# Patient Record
Sex: FEMALE | Race: WHITE | Employment: FULL TIME | URBAN - METROPOLITAN AREA
[De-identification: names, ages, dates, MRNs, and addresses within clinical notes are randomized per-mention and may not be internally consistent; named-entity substitution may affect disease eponyms.]

---

## 2023-06-30 LAB
ABO, EXTERNAL RESULT: NORMAL
HEP B, EXTERNAL RESULT: NEGATIVE
HEPATITIS C ANTIBODY, EXTERNAL RESULT: NON REACTIVE
HIV, EXTERNAL RESULT: NON REACTIVE
RH FACTOR, EXTERNAL RESULT: POSITIVE
RUBELLA TITER, EXTERNAL RESULT: NORMAL
T. PALLIDUM (SYPHILIS) ANTIBODY, EXTERNAL RESULT: NON REACTIVE

## 2023-07-01 LAB
C. TRACHOMATIS, EXTERNAL RESULT: NEGATIVE
N. GONORRHOEAE, EXTERNAL RESULT: NEGATIVE

## 2024-01-11 LAB — GBS, EXTERNAL RESULT: POSITIVE

## 2024-02-03 ENCOUNTER — HOSPITAL ENCOUNTER (INPATIENT)
Facility: HOSPITAL | Age: 29
LOS: 2 days | Discharge: HOME | End: 2024-02-05
Attending: OBSTETRICS & GYNECOLOGY | Admitting: OBSTETRICS & GYNECOLOGY
Payer: COMMERCIAL

## 2024-02-03 ENCOUNTER — ANESTHESIA EVENT (OUTPATIENT)
Dept: LABOR AND DELIVERY | Facility: HOSPITAL | Age: 29
End: 2024-02-03
Payer: COMMERCIAL

## 2024-02-03 ENCOUNTER — ANESTHESIA (OUTPATIENT)
Dept: LABOR AND DELIVERY | Facility: HOSPITAL | Age: 29
End: 2024-02-03
Payer: COMMERCIAL

## 2024-02-03 PROBLEM — O42.90 PROM (PREMATURE RUPTURE OF MEMBRANES): Status: ACTIVE | Noted: 2024-02-03

## 2024-02-03 LAB
ABO + RH BLD: NORMAL
AMPHET UR QL SCN: NEGATIVE
BARBITURATES UR QL SCN: NEGATIVE
BASOPHILS # BLD: 0.1 K/UL (ref 0–0.1)
BASOPHILS NFR BLD: 0 % (ref 0–1)
BENZODIAZ UR QL: NEGATIVE
BLOOD GROUP ANTIBODIES SERPL: NORMAL
CANNABINOIDS UR QL SCN: NEGATIVE
COCAINE UR QL SCN: NEGATIVE
DIFFERENTIAL METHOD BLD: ABNORMAL
EOSINOPHIL # BLD: 0.1 K/UL (ref 0–0.4)
EOSINOPHIL NFR BLD: 1 % (ref 0–7)
ERYTHROCYTE [DISTWIDTH] IN BLOOD BY AUTOMATED COUNT: 14.2 % (ref 11.5–14.5)
HCT VFR BLD AUTO: 31.3 % (ref 35–47)
HCT VFR BLD AUTO: 32.4 % (ref 35–47)
HCT VFR BLD AUTO: 34.4 % (ref 35–47)
HCT VFR BLD AUTO: 42 % (ref 35–47)
HGB BLD-MCNC: 10.4 G/DL (ref 11.5–16)
HGB BLD-MCNC: 10.6 G/DL (ref 11.5–16)
HGB BLD-MCNC: 11.5 G/DL (ref 11.5–16)
HGB BLD-MCNC: 13.8 G/DL (ref 11.5–16)
IMM GRANULOCYTES # BLD AUTO: 0.2 K/UL (ref 0–0.04)
IMM GRANULOCYTES NFR BLD AUTO: 1 % (ref 0–0.5)
LYMPHOCYTES # BLD: 2.9 K/UL (ref 0.8–3.5)
LYMPHOCYTES NFR BLD: 20 % (ref 12–49)
Lab: NORMAL
MCH RBC QN AUTO: 27.1 PG (ref 26–34)
MCHC RBC AUTO-ENTMCNC: 32.9 G/DL (ref 30–36.5)
MCV RBC AUTO: 82.4 FL (ref 80–99)
METHADONE UR QL: NEGATIVE
MONOCYTES # BLD: 0.9 K/UL (ref 0–1)
MONOCYTES NFR BLD: 6 % (ref 5–13)
NEUTS SEG # BLD: 10.4 K/UL (ref 1.8–8)
NEUTS SEG NFR BLD: 72 % (ref 32–75)
NRBC # BLD: 0 K/UL (ref 0–0.01)
NRBC BLD-RTO: 0 PER 100 WBC
OPIATES UR QL: NEGATIVE
PCP UR QL: NEGATIVE
PLATELET # BLD AUTO: 385 K/UL (ref 150–400)
PMV BLD AUTO: 10.3 FL (ref 8.9–12.9)
RBC # BLD AUTO: 5.1 M/UL (ref 3.8–5.2)
SPECIMEN EXP DATE BLD: NORMAL
WBC # BLD AUTO: 14.5 K/UL (ref 3.6–11)

## 2024-02-03 PROCEDURE — 3700000156 HC EPIDURAL ANESTHESIA

## 2024-02-03 PROCEDURE — 1120000000 HC RM PRIVATE OB

## 2024-02-03 PROCEDURE — 4A1HXCZ MONITORING OF PRODUCTS OF CONCEPTION, CARDIAC RATE, EXTERNAL APPROACH: ICD-10-PCS | Performed by: OBSTETRICS & GYNECOLOGY

## 2024-02-03 PROCEDURE — 7210000100 HC LABOR FEE PER 1 HR

## 2024-02-03 PROCEDURE — 86901 BLOOD TYPING SEROLOGIC RH(D): CPT

## 2024-02-03 PROCEDURE — 4500000002 HC ER NO CHARGE

## 2024-02-03 PROCEDURE — 6370000000 HC RX 637 (ALT 250 FOR IP): Performed by: OBSTETRICS & GYNECOLOGY

## 2024-02-03 PROCEDURE — 51702 INSERT TEMP BLADDER CATH: CPT

## 2024-02-03 PROCEDURE — 2580000003 HC RX 258: Performed by: OBSTETRICS & GYNECOLOGY

## 2024-02-03 PROCEDURE — 0KQM0ZZ REPAIR PERINEUM MUSCLE, OPEN APPROACH: ICD-10-PCS | Performed by: OBSTETRICS & GYNECOLOGY

## 2024-02-03 PROCEDURE — 85014 HEMATOCRIT: CPT

## 2024-02-03 PROCEDURE — 6360000002 HC RX W HCPCS: Performed by: ANESTHESIOLOGY

## 2024-02-03 PROCEDURE — 0US9XZZ REPOSITION UTERUS, EXTERNAL APPROACH: ICD-10-PCS | Performed by: OBSTETRICS & GYNECOLOGY

## 2024-02-03 PROCEDURE — 36415 COLL VENOUS BLD VENIPUNCTURE: CPT

## 2024-02-03 PROCEDURE — 85025 COMPLETE CBC W/AUTO DIFF WBC: CPT

## 2024-02-03 PROCEDURE — 6360000002 HC RX W HCPCS: Performed by: OBSTETRICS & GYNECOLOGY

## 2024-02-03 PROCEDURE — 86850 RBC ANTIBODY SCREEN: CPT

## 2024-02-03 PROCEDURE — 80307 DRUG TEST PRSMV CHEM ANLYZR: CPT

## 2024-02-03 PROCEDURE — 2500000003 HC RX 250 WO HCPCS

## 2024-02-03 PROCEDURE — 3E033VJ INTRODUCTION OF OTHER HORMONE INTO PERIPHERAL VEIN, PERCUTANEOUS APPROACH: ICD-10-PCS | Performed by: OBSTETRICS & GYNECOLOGY

## 2024-02-03 PROCEDURE — 7220000101 HC DELIVERY VAGINAL/SINGLE

## 2024-02-03 PROCEDURE — 6360000002 HC RX W HCPCS

## 2024-02-03 PROCEDURE — 00HU33Z INSERTION OF INFUSION DEVICE INTO SPINAL CANAL, PERCUTANEOUS APPROACH: ICD-10-PCS | Performed by: OBSTETRICS & GYNECOLOGY

## 2024-02-03 PROCEDURE — 10D17ZZ EXTRACTION OF PRODUCTS OF CONCEPTION, RETAINED, VIA NATURAL OR ARTIFICIAL OPENING: ICD-10-PCS | Performed by: OBSTETRICS & GYNECOLOGY

## 2024-02-03 PROCEDURE — 62325 NJX INTERLAMINAR CRV/THRC: CPT | Performed by: ANESTHESIOLOGY

## 2024-02-03 PROCEDURE — 85018 HEMOGLOBIN: CPT

## 2024-02-03 PROCEDURE — 2500000003 HC RX 250 WO HCPCS: Performed by: ANESTHESIOLOGY

## 2024-02-03 PROCEDURE — 86900 BLOOD TYPING SEROLOGIC ABO: CPT

## 2024-02-03 RX ORDER — ONDANSETRON 2 MG/ML
4 INJECTION INTRAMUSCULAR; INTRAVENOUS EVERY 6 HOURS PRN
Status: DISCONTINUED | OUTPATIENT
Start: 2024-02-03 | End: 2024-02-03

## 2024-02-03 RX ORDER — SODIUM CHLORIDE 0.9 % (FLUSH) 0.9 %
5-40 SYRINGE (ML) INJECTION EVERY 12 HOURS SCHEDULED
Status: DISCONTINUED | OUTPATIENT
Start: 2024-02-03 | End: 2024-02-03

## 2024-02-03 RX ORDER — EPHEDRINE SULFATE/0.9% NACL/PF 50 MG/5 ML
10 SYRINGE (ML) INTRAVENOUS ONCE
Status: COMPLETED | OUTPATIENT
Start: 2024-02-03 | End: 2024-02-03

## 2024-02-03 RX ORDER — SODIUM CHLORIDE, SODIUM LACTATE, POTASSIUM CHLORIDE, CALCIUM CHLORIDE 600; 310; 30; 20 MG/100ML; MG/100ML; MG/100ML; MG/100ML
INJECTION, SOLUTION INTRAVENOUS CONTINUOUS
Status: DISCONTINUED | OUTPATIENT
Start: 2024-02-03 | End: 2024-02-03

## 2024-02-03 RX ORDER — SODIUM CHLORIDE 9 MG/ML
25 INJECTION, SOLUTION INTRAVENOUS PRN
Status: DISCONTINUED | OUTPATIENT
Start: 2024-02-03 | End: 2024-02-03

## 2024-02-03 RX ORDER — CARBOPROST TROMETHAMINE 250 UG/ML
250 INJECTION, SOLUTION INTRAMUSCULAR PRN
Status: DISCONTINUED | OUTPATIENT
Start: 2024-02-03 | End: 2024-02-03

## 2024-02-03 RX ORDER — MISOPROSTOL 200 UG/1
800 TABLET ORAL PRN
Status: DISCONTINUED | OUTPATIENT
Start: 2024-02-03 | End: 2024-02-03

## 2024-02-03 RX ORDER — LANOLIN/MINERAL OIL
LOTION (ML) TOPICAL PRN
Status: DISCONTINUED | OUTPATIENT
Start: 2024-02-03 | End: 2024-02-05 | Stop reason: HOSPADM

## 2024-02-03 RX ORDER — FENTANYL 0.2 MG/100ML-BUPIV 0.125%-NACL 0.9% EPIDURAL INJ 2/0.125 MCG/ML-%
1-15 SOLUTION INJECTION CONTINUOUS
Status: DISCONTINUED | OUTPATIENT
Start: 2024-02-03 | End: 2024-02-03

## 2024-02-03 RX ORDER — BUPIVACAINE HYDROCHLORIDE 2.5 MG/ML
INJECTION, SOLUTION EPIDURAL; INFILTRATION; INTRACAUDAL
Status: COMPLETED
Start: 2024-02-03 | End: 2024-02-03

## 2024-02-03 RX ORDER — SODIUM CHLORIDE 0.9 % (FLUSH) 0.9 %
5-40 SYRINGE (ML) INJECTION PRN
Status: DISCONTINUED | OUTPATIENT
Start: 2024-02-03 | End: 2024-02-05 | Stop reason: HOSPADM

## 2024-02-03 RX ORDER — SODIUM CHLORIDE 0.9 % (FLUSH) 0.9 %
5-40 SYRINGE (ML) INJECTION PRN
Status: DISCONTINUED | OUTPATIENT
Start: 2024-02-03 | End: 2024-02-03

## 2024-02-03 RX ORDER — DIPHENHYDRAMINE HYDROCHLORIDE 50 MG/ML
12.5 INJECTION INTRAMUSCULAR; INTRAVENOUS EVERY 4 HOURS PRN
Status: DISCONTINUED | OUTPATIENT
Start: 2024-02-03 | End: 2024-02-03

## 2024-02-03 RX ORDER — SODIUM CHLORIDE, SODIUM LACTATE, POTASSIUM CHLORIDE, AND CALCIUM CHLORIDE .6; .31; .03; .02 G/100ML; G/100ML; G/100ML; G/100ML
500 INJECTION, SOLUTION INTRAVENOUS PRN
Status: DISCONTINUED | OUTPATIENT
Start: 2024-02-03 | End: 2024-02-03

## 2024-02-03 RX ORDER — ACETAMINOPHEN 500 MG
1000 TABLET ORAL EVERY 8 HOURS SCHEDULED
Status: DISCONTINUED | OUTPATIENT
Start: 2024-02-03 | End: 2024-02-05 | Stop reason: HOSPADM

## 2024-02-03 RX ORDER — OXYCODONE HYDROCHLORIDE 5 MG/1
5 TABLET ORAL EVERY 4 HOURS PRN
Status: DISCONTINUED | OUTPATIENT
Start: 2024-02-03 | End: 2024-02-05 | Stop reason: HOSPADM

## 2024-02-03 RX ORDER — CLINDAMYCIN PHOSPHATE 900 MG/50ML
900 INJECTION, SOLUTION INTRAVENOUS EVERY 8 HOURS
Status: DISCONTINUED | OUTPATIENT
Start: 2024-02-03 | End: 2024-02-04

## 2024-02-03 RX ORDER — DOCUSATE SODIUM 100 MG/1
100 CAPSULE, LIQUID FILLED ORAL 2 TIMES DAILY
Status: DISCONTINUED | OUTPATIENT
Start: 2024-02-03 | End: 2024-02-05 | Stop reason: HOSPADM

## 2024-02-03 RX ORDER — BUPIVACAINE HYDROCHLORIDE 2.5 MG/ML
INJECTION, SOLUTION EPIDURAL; INFILTRATION; INTRACAUDAL PRN
Status: DISCONTINUED | OUTPATIENT
Start: 2024-02-03 | End: 2024-02-03 | Stop reason: SDUPTHER

## 2024-02-03 RX ORDER — DOCUSATE SODIUM 100 MG/1
100 CAPSULE, LIQUID FILLED ORAL 2 TIMES DAILY
Status: DISCONTINUED | OUTPATIENT
Start: 2024-02-03 | End: 2024-02-03

## 2024-02-03 RX ORDER — FENTANYL 0.2 MG/100ML-BUPIV 0.125%-NACL 0.9% EPIDURAL INJ 2/0.125 MCG/ML-%
SOLUTION INJECTION
Status: COMPLETED
Start: 2024-02-03 | End: 2024-02-03

## 2024-02-03 RX ORDER — FENTANYL CITRATE 50 UG/ML
INJECTION, SOLUTION INTRAMUSCULAR; INTRAVENOUS
Status: COMPLETED
Start: 2024-02-03 | End: 2024-02-03

## 2024-02-03 RX ORDER — IBUPROFEN 400 MG/1
800 TABLET ORAL EVERY 8 HOURS SCHEDULED
Status: DISCONTINUED | OUTPATIENT
Start: 2024-02-03 | End: 2024-02-05 | Stop reason: HOSPADM

## 2024-02-03 RX ORDER — FENTANYL CITRATE 50 UG/ML
50 INJECTION, SOLUTION INTRAMUSCULAR; INTRAVENOUS ONCE
Status: COMPLETED | OUTPATIENT
Start: 2024-02-03 | End: 2024-02-03

## 2024-02-03 RX ORDER — SODIUM CHLORIDE 9 MG/ML
INJECTION, SOLUTION INTRAVENOUS PRN
Status: DISCONTINUED | OUTPATIENT
Start: 2024-02-03 | End: 2024-02-05 | Stop reason: HOSPADM

## 2024-02-03 RX ORDER — ONDANSETRON 8 MG/1
4 TABLET, ORALLY DISINTEGRATING ORAL EVERY 8 HOURS PRN
Status: ON HOLD | COMMUNITY
End: 2024-02-05 | Stop reason: HOSPADM

## 2024-02-03 RX ORDER — METHYLERGONOVINE MALEATE 0.2 MG/ML
200 INJECTION INTRAVENOUS PRN
Status: DISCONTINUED | OUTPATIENT
Start: 2024-02-03 | End: 2024-02-03

## 2024-02-03 RX ORDER — SODIUM CHLORIDE, SODIUM LACTATE, POTASSIUM CHLORIDE, AND CALCIUM CHLORIDE .6; .31; .03; .02 G/100ML; G/100ML; G/100ML; G/100ML
1000 INJECTION, SOLUTION INTRAVENOUS ONCE
Status: COMPLETED | OUTPATIENT
Start: 2024-02-03 | End: 2024-02-03

## 2024-02-03 RX ORDER — NALOXONE HYDROCHLORIDE 0.4 MG/ML
INJECTION, SOLUTION INTRAMUSCULAR; INTRAVENOUS; SUBCUTANEOUS PRN
Status: DISCONTINUED | OUTPATIENT
Start: 2024-02-03 | End: 2024-02-03

## 2024-02-03 RX ORDER — SODIUM CHLORIDE, SODIUM LACTATE, POTASSIUM CHLORIDE, AND CALCIUM CHLORIDE .6; .31; .03; .02 G/100ML; G/100ML; G/100ML; G/100ML
1000 INJECTION, SOLUTION INTRAVENOUS PRN
Status: DISCONTINUED | OUTPATIENT
Start: 2024-02-03 | End: 2024-02-03

## 2024-02-03 RX ADMIN — FENTANYL 0.2 MG/100ML-BUPIV 0.125%-NACL 0.9% EPIDURAL INJ 12 ML/HR: 2/0.125 SOLUTION at 11:01

## 2024-02-03 RX ADMIN — Medication 12 ML/HR: at 11:01

## 2024-02-03 RX ADMIN — IRON SUCROSE 200 MG: 20 INJECTION, SOLUTION INTRAVENOUS at 22:03

## 2024-02-03 RX ADMIN — OXYTOCIN 87.3 MILLI-UNITS/MIN: 10 INJECTION INTRAVENOUS at 18:53

## 2024-02-03 RX ADMIN — SODIUM CHLORIDE, POTASSIUM CHLORIDE, SODIUM LACTATE AND CALCIUM CHLORIDE 1000 ML: 600; 310; 30; 20 INJECTION, SOLUTION INTRAVENOUS at 19:43

## 2024-02-03 RX ADMIN — Medication 10 MG: at 10:42

## 2024-02-03 RX ADMIN — CLINDAMYCIN PHOSPHATE 900 MG: 900 INJECTION, SOLUTION INTRAVENOUS at 09:29

## 2024-02-03 RX ADMIN — FENTANYL CITRATE 50 MCG: 50 INJECTION INTRAMUSCULAR; INTRAVENOUS at 18:43

## 2024-02-03 RX ADMIN — SODIUM CHLORIDE, POTASSIUM CHLORIDE, SODIUM LACTATE AND CALCIUM CHLORIDE: 600; 310; 30; 20 INJECTION, SOLUTION INTRAVENOUS at 14:20

## 2024-02-03 RX ADMIN — BUPIVACAINE HYDROCHLORIDE 3 ML: 2.5 INJECTION, SOLUTION EPIDURAL; INFILTRATION; INTRACAUDAL; PERINEURAL at 10:25

## 2024-02-03 RX ADMIN — Medication 166.7 ML: at 18:35

## 2024-02-03 RX ADMIN — GENTAMICIN SULFATE 115.2 MG: 40 INJECTION, SOLUTION INTRAMUSCULAR; INTRAVENOUS at 19:45

## 2024-02-03 RX ADMIN — SODIUM CHLORIDE, POTASSIUM CHLORIDE, SODIUM LACTATE AND CALCIUM CHLORIDE: 600; 310; 30; 20 INJECTION, SOLUTION INTRAVENOUS at 09:13

## 2024-02-03 RX ADMIN — IBUPROFEN 800 MG: 400 TABLET, FILM COATED ORAL at 20:34

## 2024-02-03 RX ADMIN — ACETAMINOPHEN 1000 MG: 500 TABLET ORAL at 21:45

## 2024-02-03 RX ADMIN — BUPIVACAINE HYDROCHLORIDE 5 ML: 2.5 INJECTION, SOLUTION EPIDURAL; INFILTRATION; INTRACAUDAL; PERINEURAL at 10:26

## 2024-02-03 RX ADMIN — Medication 166.7 ML: at 18:41

## 2024-02-03 RX ADMIN — BUPIVACAINE HYDROCHLORIDE 3 ML: 2.5 INJECTION, SOLUTION EPIDURAL; INFILTRATION; INTRACAUDAL; PERINEURAL at 10:27

## 2024-02-03 RX ADMIN — DOCUSATE SODIUM 100 MG: 100 CAPSULE, LIQUID FILLED ORAL at 20:34

## 2024-02-03 RX ADMIN — FENTANYL CITRATE 50 MCG: 50 INJECTION, SOLUTION INTRAMUSCULAR; INTRAVENOUS at 18:43

## 2024-02-03 RX ADMIN — CLINDAMYCIN PHOSPHATE 900 MG: 900 INJECTION, SOLUTION INTRAVENOUS at 17:49

## 2024-02-03 ASSESSMENT — PAIN DESCRIPTION - DESCRIPTORS: DESCRIPTORS: CRAMPING

## 2024-02-03 ASSESSMENT — PAIN SCALES - GENERAL: PAINLEVEL_OUTOF10: 1

## 2024-02-03 ASSESSMENT — PAIN DESCRIPTION - LOCATION: LOCATION: ABDOMEN

## 2024-02-03 NOTE — PROGRESS NOTES
In to evaluate patient  Variable decelerations with pushing    She has been pushing for an hour. Evaluated pushing. Will give 15-20 minute rest, then resume pushing.   Small amount of caput, some progress with pushing

## 2024-02-03 NOTE — PROGRESS NOTES
In to see patient, prolonged fetal deceleration after epidural placement for 7-8 minutes, stephanie at 90's. Improved with position change, hypotension noted, discussed ephedrine, but did improve with position change, fluids.   Cervix examined by staff-- 6cm    Discussed all with patient and . Fetal tracing now 120's, minimal-moderate variability.

## 2024-02-03 NOTE — ANESTHESIA PROCEDURE NOTES
Epidural Block    Patient location during procedure: OB  Start time: 2/3/2024 10:20 AM  End time: 2/3/2024 10:27 AM  Reason for block: labor epidural and post-op pain management  Staffing  Performed: anesthesiologist   Anesthesiologist: Taj Pollack MD  Performed by: Taj Pollack MD  Authorized by: Taj Pollack MD    Epidural  Patient position: sitting  Prep: DuraPrep  Patient monitoring: frequent blood pressure checks and continuous pulse ox  Approach: midline  Location: L3-4  Injection technique: OLY air  Provider prep: mask and sterile gloves  Needle  Needle type: Notizza   Needle gauge: 17 G  Needle length: 3.5 in  Needle insertion depth: 8 cm  Catheter type: end hole  Catheter size: 19 G  Catheter at skin depth: 12 cm  Test dose: negativeCatheter Secured: tegaderm and tape  Assessment  Hemodynamics: stable  Attempts: 1  Outcomes: uncomplicated  Preanesthetic Checklist  Completed: patient identified, IV checked, site marked, risks and benefits discussed, surgical/procedural consents, equipment checked, pre-op evaluation, timeout performed, anesthesia consent given, oxygen available, monitors applied/VS acknowledged and fire risk safety assessment completed and verbalized

## 2024-02-03 NOTE — ANESTHESIA PRE PROCEDURE
acid (CYKLOKAPRON) 1,000 mg in sodium chloride 0.9 % 110 mL IVPB (mini-bag)  1,000 mg IntraVENous Once PRN Patricia Mulilgan MD       • oxytocin (PITOCIN) 30 units in 500 mL infusion  87.3 jorge alberto-units/min IntraVENous Continuous PRN Patricia Mulligan MD        And   • oxytocin (PITOCIN) 10 unit bolus from the bag  10 Units IntraVENous PRN Patricia Mulligan MD       • ondansetron (ZOFRAN) injection 4 mg  4 mg IntraVENous Q6H PRN Patricia Mulligan MD       • docusate sodium (COLACE) capsule 100 mg  100 mg Oral BID Patricia Mulligan MD       • diphenhydrAMINE (BENADRYL) injection 12.5 mg  12.5 mg IntraVENous Q4H PRN Patricai Mulligan MD       • fentaNYL 2 mcg/mL BUPivacaine 0.125% in sodium chloride 0.9% 100 mL 0.2-0.125-0.9 MG/100ML-% epidural infusion            • fentaNYL 2 mcg/mL BUPivacaine 0.125% in sodium chloride 0.9% 100 mL epidural infusion  1-15 mL/hr Epidural Continuous Taj Pollack MD       • naloxone 0.4 mg in 10 mL sodium chloride syringe   IntraVENous PRN Taj Pollack MD         Facility-Administered Medications Ordered in Other Encounters   Medication Dose Route Frequency Provider Last Rate Last Admin   • BUPivacaine (PF) (MARCAINE) 0.25 % injection   Epidural PRN Taj Pollack MD   3 mL at 02/03/24 1027       Allergies:    Allergies   Allergen Reactions   • Penicillins Shortness Of Breath   • Sulfa Antibiotics Shortness Of Breath       Problem List:    Patient Active Problem List   Diagnosis Code   • Dermatitis L30.9   • PROM (premature rupture of membranes) O42.90       Past Medical History:        Diagnosis Date   • Anxiety        Past Surgical History:  History reviewed. No pertinent surgical history.    Social History:    Social History     Tobacco Use   • Smoking status: Not on file   • Smokeless tobacco: Not on file   Substance Use Topics   • Alcohol use: Not on file                                Counseling given: Not Answered      Vital Signs (Current):   Vitals:    02/03/24 0902

## 2024-02-03 NOTE — PROGRESS NOTES
Patient arrived to L&D triage from home with complaints of contractions and LOF. Patient states has been diana on and off all night, ROM at about 0700 and contractions picked up and became more consistent then. Patient of Dr. Moncada at [facility], , GEORIGANA 24 39w2d. Patient confirms positive fetal movement. Patient denies vaginal bleeding, headaches, vision changes, RUQ pain.   Patient membranes grossly ruptured on assessment, meconium stained.     905 - dr. Servin made aware of patient arrival. Telephone order for clindamycin     913 - IV fluid bolus for epidural started.     918 - Dr. Servin at bedside, SVE done with patient consent. POC discussed with patient   24 09   Cervical Exam   Dilation (cm) 4   Effacement 90   Station -1   Station (Labor Curve Graph) 6   OB Examiner Dr servin     1015 - Anesthesia at bedside at 1015. Patient positioned to side of the bed, EFM & TOCO adjusted to accommodate sterile field. Difficulty tracing due to maternal position. Time out completed at 1016. Successful epidural is completed by Dr Pollack. Patient is repositioned supine in bed with wedge under right hip. EFM and TOCO replaced and blood pressure frequency increased. Coffman catheter placed and epidural continuous infusion is started.    1032 - FHR to 110. Patient turned later laterally multiple times.   1035 - SVE done by THERESA Bertrand RN, 6cm  1036 - Dr. Servin at bedside to assess patient turning prolonged decel. Dr. Pollack at bedside to assess epidural. Patient hypotensive. Verbal order for 500ml IV fluid bolus. Verbal order for 10mg of ephedrine, give x2 IV PRN and PRN 10mg IM if needed after 2 IV doses.   1042 - 10mg IV ephedrine given    1140 - patient made RN aware that was having increased pain in L side of back with contractions. Patient repositioned to see if gave relief     1150 - Patient with minimal relief. Patient educated on epidural bolus button. Button hit by patient and patient laying on L

## 2024-02-03 NOTE — H&P
Active Problem List   Diagnosis    Dermatitis    PROM (premature rupture of membranes)     Assessment and Plan:     29 y/o G1 @ 39w2d @ 39w2d presents with PROM    IUP- category 1    2. PROM- cervix unchanged from office exam but diana regularly. Will re evaluate. Patient desires epidural. Vertex on exam    3. GBS +: allergic to penicillin but GBS is susceptible to clindamycin- initiated this for prophylaxis.     4. Anxiety- no medications    5. Meconium- discussed outcomes with patient and  including possible intubation of infant. Will evaluate immediately post  as baby transitions.        I spent 40 minutes in review of patient's history, performing physical exam, admission and review of prenatal from 2023- 2024.     Signed By:  Patricia Mulligan MD     February 3, 2024

## 2024-02-03 NOTE — PROGRESS NOTES
Labor Progress Note  Patient seen, fetal heart rate and contraction pattern evaluated.     Physical Exam:  /86   Pulse 100   Temp 97.7 °F (36.5 °C) (Oral)   Resp 16   Ht 1.575 m (5' 2\")   Wt 116.6 kg (257 lb)   SpO2 99%   BMI 47.01 kg/m²   Cervical Exam: complete, +1  Membranes:  SROM, meconium  Uterine Activity: Frequency: 2 minutes  Fetal Heart Rate: 120 - 130,  adequate variability and reactivity  Accelerations: yes  Decelerations: no    Assessment/Plan: 29 y/o  @ 39wks with SROM    IUP- Reassuring fetal status, category 1    2. Labor/SROM- will try throne for 30-40 minutes, allow additional descent then attempt pushing    3. GBS+ : clinrenzoycin    NAVDEEP Mulligan MD

## 2024-02-03 NOTE — LACTATION NOTE
Discussed with mother her plan for feeding.  Reviewed the benefits of exclusive breast milk feeding during the hospital stay.  Informed mother of the risks of using formula to supplement in the first few days of life as well as the benefits of successful breast milk feeding. Mother acknowledges understanding of information reviewed and states that it is her plan to breast milk feed exclusively her infant.  Will support her choice and offer additional information as needed.

## 2024-02-03 NOTE — ED TRIAGE NOTES
pt arrived, registered, family member put her in a wheelchair and took her upstairs to L&D before RN could call her back to triage. Spoke with L&D  Josy via phone and she is aware.

## 2024-02-04 LAB
BASOPHILS # BLD: 0.1 K/UL (ref 0–0.1)
BASOPHILS NFR BLD: 0 % (ref 0–1)
DIFFERENTIAL METHOD BLD: ABNORMAL
EOSINOPHIL # BLD: 0 K/UL (ref 0–0.4)
EOSINOPHIL NFR BLD: 0 % (ref 0–7)
ERYTHROCYTE [DISTWIDTH] IN BLOOD BY AUTOMATED COUNT: 14.6 % (ref 11.5–14.5)
HCT VFR BLD AUTO: 28.6 % (ref 35–47)
HCT VFR BLD AUTO: 28.9 % (ref 35–47)
HGB BLD-MCNC: 9.3 G/DL (ref 11.5–16)
HGB BLD-MCNC: 9.4 G/DL (ref 11.5–16)
IMM GRANULOCYTES # BLD AUTO: 0.3 K/UL (ref 0–0.04)
IMM GRANULOCYTES NFR BLD AUTO: 2 % (ref 0–0.5)
LYMPHOCYTES # BLD: 3.1 K/UL (ref 0.8–3.5)
LYMPHOCYTES NFR BLD: 16 % (ref 12–49)
MCH RBC QN AUTO: 27.9 PG (ref 26–34)
MCHC RBC AUTO-ENTMCNC: 32.5 G/DL (ref 30–36.5)
MCV RBC AUTO: 85.9 FL (ref 80–99)
MONOCYTES # BLD: 1 K/UL (ref 0–1)
MONOCYTES NFR BLD: 5 % (ref 5–13)
NEUTS SEG # BLD: 14.2 K/UL (ref 1.8–8)
NEUTS SEG NFR BLD: 77 % (ref 32–75)
NRBC # BLD: 0 K/UL (ref 0–0.01)
NRBC BLD-RTO: 0 PER 100 WBC
PLATELET # BLD AUTO: 321 K/UL (ref 150–400)
PMV BLD AUTO: 10.5 FL (ref 8.9–12.9)
RBC # BLD AUTO: 3.33 M/UL (ref 3.8–5.2)
WBC # BLD AUTO: 18.7 K/UL (ref 3.6–11)

## 2024-02-04 PROCEDURE — 6360000002 HC RX W HCPCS: Performed by: OBSTETRICS & GYNECOLOGY

## 2024-02-04 PROCEDURE — 85018 HEMOGLOBIN: CPT

## 2024-02-04 PROCEDURE — 85025 COMPLETE CBC W/AUTO DIFF WBC: CPT

## 2024-02-04 PROCEDURE — 6370000000 HC RX 637 (ALT 250 FOR IP): Performed by: OBSTETRICS & GYNECOLOGY

## 2024-02-04 PROCEDURE — 2580000003 HC RX 258: Performed by: OBSTETRICS & GYNECOLOGY

## 2024-02-04 PROCEDURE — 6370000000 HC RX 637 (ALT 250 FOR IP): Performed by: STUDENT IN AN ORGANIZED HEALTH CARE EDUCATION/TRAINING PROGRAM

## 2024-02-04 PROCEDURE — 1120000000 HC RM PRIVATE OB

## 2024-02-04 PROCEDURE — 36415 COLL VENOUS BLD VENIPUNCTURE: CPT

## 2024-02-04 PROCEDURE — 85014 HEMATOCRIT: CPT

## 2024-02-04 RX ORDER — DIPHENHYDRAMINE HCL 25 MG
25 CAPSULE ORAL EVERY 6 HOURS PRN
Status: DISCONTINUED | OUTPATIENT
Start: 2024-02-04 | End: 2024-02-05 | Stop reason: HOSPADM

## 2024-02-04 RX ORDER — ONDANSETRON 4 MG/1
4 TABLET, ORALLY DISINTEGRATING ORAL EVERY 8 HOURS PRN
Status: DISCONTINUED | OUTPATIENT
Start: 2024-02-04 | End: 2024-02-05 | Stop reason: HOSPADM

## 2024-02-04 RX ADMIN — GENTAMICIN SULFATE 115.2 MG: 40 INJECTION, SOLUTION INTRAMUSCULAR; INTRAVENOUS at 06:05

## 2024-02-04 RX ADMIN — ONDANSETRON 4 MG: 4 TABLET, ORALLY DISINTEGRATING ORAL at 22:57

## 2024-02-04 RX ADMIN — ACETAMINOPHEN 1000 MG: 500 TABLET ORAL at 05:48

## 2024-02-04 RX ADMIN — DIPHENHYDRAMINE HYDROCHLORIDE 25 MG: 25 CAPSULE ORAL at 23:22

## 2024-02-04 RX ADMIN — CLINDAMYCIN PHOSPHATE 900 MG: 900 INJECTION, SOLUTION INTRAVENOUS at 12:08

## 2024-02-04 RX ADMIN — CLINDAMYCIN PHOSPHATE 900 MG: 900 INJECTION, SOLUTION INTRAVENOUS at 03:57

## 2024-02-04 RX ADMIN — DOCUSATE SODIUM 100 MG: 100 CAPSULE, LIQUID FILLED ORAL at 09:42

## 2024-02-04 RX ADMIN — IBUPROFEN 800 MG: 400 TABLET, FILM COATED ORAL at 05:47

## 2024-02-04 RX ADMIN — ACETAMINOPHEN 1000 MG: 500 TABLET ORAL at 14:10

## 2024-02-04 RX ADMIN — IBUPROFEN 800 MG: 400 TABLET, FILM COATED ORAL at 14:09

## 2024-02-04 RX ADMIN — GENTAMICIN SULFATE 115.2 MG: 40 INJECTION, SOLUTION INTRAMUSCULAR; INTRAVENOUS at 14:09

## 2024-02-04 ASSESSMENT — PAIN SCALES - GENERAL: PAINLEVEL_OUTOF10: 1

## 2024-02-04 ASSESSMENT — PAIN DESCRIPTION - LOCATION: LOCATION: ABDOMEN;PERINEUM

## 2024-02-04 ASSESSMENT — PAIN DESCRIPTION - DESCRIPTORS: DESCRIPTORS: ACHING;CRAMPING;SORE

## 2024-02-04 ASSESSMENT — PAIN DESCRIPTION - ORIENTATION: ORIENTATION: LOWER;MID

## 2024-02-04 NOTE — PROGRESS NOTES
Reviewed labs, H&H down to 9.5    Discussion with RN- patient's bleeding normal, has voided and able to ambulate    Will continue to monitor vital signs closely and repeat H&H at noon

## 2024-02-04 NOTE — FLOWSHEET NOTE
1905-[facility] REPORT AT BEDSIDE.  DR QUESADA STILL W/ PT CONTINUING REPAIR.  POC DISCUSSED.  PT W/ NO COMPLAINTS OF PAIN AT THIS TIME.  1930-CALL TO DR QUESADA REGARDING PTS PULSE CONTINUING TO STAY 140'S-150'S.  ORDERS RECEIVED FOR 1 LITER LR FLUID BOLUS, AND REPEAT H/H LABS IN 1 HOUR.  1945-CLEAR LIQUIDS BROUGHT TO PT.  PT W/ NO COMPLAINTS AT THIS TIME.  FAMILY AT BEDSIDE OFFERING POSITIVE SUPPORT.  PT EDUCATED 1ST TIME OOB TO BE W/ RN.  PT STATES UNDERSTANDING.    2135-CALL TO DR QUESADA REGARDING PTS PULSE REMAINING 130'S-140'S.  PT W/ NO PAIN, AFEBRILE, BP'S WNL.  BLEEDING FINE.  ORDERS WILL BE PUT IN FOR ANOTHER H&H.  2200-PT SITTING UP EATING W/ NO COMPLAINTS.    2220-IRON INFUSION COMPLETE, PT TOLERATED WELL.  2300-EPIDURAL CATHETER REMOVED W/ BLUE TIP INTACT, PT TOLERATED WELL.  PT ASSISTED BY 2 RN'S TO BATHROOM.  PT UNABLE TO VOID AT THIS TIME.  PT WAS FEELING N/V.  WHEN ATTEMPTING TO GET PT UP FROM TOILET TO WHEELCHAIR, SHE STATED SHE WAS STARTING TO FEEL DIZZY AND N/V.  PT WAS PALE AND SWEATING.  ICE PACK APPLIED, AMMONIA INHALANT UTILIZED.  PT FELT BETTER AFTER A COUPLE OF MINUTES AND WAS PUT INTO WHEELCHAIR IN BATHROOM AND TAKEN TO PP ROOM.   2330-[facility] REPORT TO RECEIVING RN CHAVO BRASHER RN.  RELINQUISHED CARE OF PATIENT.

## 2024-02-04 NOTE — PROGRESS NOTES
Post-Partum Day Number 1 Progress Note    Tiki Martinez     Assessment: Doing well, post partum day 1 s/p VAVD, uterine inversion, retained products requiring banjo curette.  . Hgb 13.8--> 9.8    Plan:  - Continue routine postpartum and perineal care as well as maternal education.  - Repeat CBC @ 12. Continue to follow. Bleeding appropriate.  - The risks and benefits of the circumcision  procedure and anesthesia including: bleeding, infection, variability of cosmetic results were discussed at length with the mother. She is aware that future repeat procedures may be necessary. She gives informed consent to proceed as noted and her questions are answered; Held for feeding.  - Plan discharge home tomorrow.    Information for the patient's :  AVA Martinez [[id number]]   Vaginal, Vacuum (Extractor)  Patient doing well without significant complaint.  Voiding without difficulty, normal lochia.    Vitals:  /62   Pulse (!) 103   Temp 98.3 °F (36.8 °C) (Oral)   Resp 15   Ht 1.575 m (5' 2\")   Wt 116.6 kg (257 lb)   SpO2 98%   Breastfeeding Unknown   BMI 47.01 kg/m²   Temp (24hrs), Av.2 °F (36.8 °C), Min:97.7 °F (36.5 °C), Max:98.6 °F (37 °C)        Exam:   Patient without distress.                  Fundus firm, nontender per nursing fundal checks.                Perineum with normal lochia noted per nursing assessment.                Lower extremities are negative for pathological edema.    Labs:     Lab Results   Component Value Date/Time    WBC 14.5 2024 09:15 AM    HGB 9.4 2024 06:14 AM    HGB 10.4 2024 10:02 PM    HGB 10.6 2024 08:33 PM    HGB 11.5 2024 06:52 PM    HGB 13.8 2024 09:15 AM    HCT 28.9 2024 06:14 AM    HCT 31.3 2024 10:02 PM    HCT 32.4 2024 08:33 PM    HCT 34.4 2024 06:52 PM    HCT 42.0 2024 09:15 AM     2024 09:15 AM       Recent Results (from the past 24 hour(s))   Hemoglobin and

## 2024-02-04 NOTE — PROGRESS NOTES
Reviewed labs  Hemoglobin stable  Iron transfusion complete  Pulse improving  Repeat H&H in AM or sooner if clinically indicated

## 2024-02-04 NOTE — L&D DELIVERY NOTE
This was replaced and pitocin started while I held the fundus in position. When the uterus started to contract, my hand was removed and ultrasound confirmed placement of the uterus in proper position. Bleeding during this time was minimal.  A Banjo currette was used to remove additional placental tissue.  A repeat ultrasound confirmed normal position of the uterus and no obvious retained placenta. The fundus became firm, the cervix and sulci were inspected and appeared to be intact.  A second degree left side wall laceration was repaired with 2-0 Vicryl suture and 3-0 Vicryl in layers. There was a first degree laceration on the right vaginal wall that was repaired.  Vaginal exam revealed no evidence of hematoma formation or foreign bodies.  Sponge and sharps count was correct.  The procedure was tolerated adequately by the patient and she is recovering in stable condition.     EBL: 900        AVA Martinez [[id number]]      Labor Events      Cervical Ripening Date/Time:        Rupture Date/Time:  2/3/24 07:00:00   Rupture Type: PROM  Fluid Color: Meconium  Fluid Odor: None  Labor Complications: Uterine Inversion              Anesthesia    Method: Epidural       Labor Event Times      Labor onset date/time:        Dilation complete date/time:  2/3/24 14:10:00 EST     Start pushing date/time:  2/3/2024 14:50:00   Decision date/time (emergent ):            Labor Length    2nd stage: 4h 18m  3rd stage: 0h 08m       Delivery Details      Delivery Date: 2/3/24 Delivery Time: 18:28:00   Delivery Type: Vaginal, Vacuum (Extractor)               Presentation    Presentation: Compound       Shoulder Dystocia    Shoulder Dystocia Present?: No       Assisted Delivery Details    Forceps Attempted?: No  Vacuum Extractor Attempted?: Yes  Indications: Maternal Fatigue, Fetal Heart Rate or Rhythm Abnormality   Vacuum Type: Kiwi   First Attempt Time Vacuum Applied:  EST    First Attempt Time Vacuum Removed:

## 2024-02-04 NOTE — PROGRESS NOTES
H&H from time of delivery reviewed    13.8 -->11.5.    Patient tachycardic, otherwise asymptomatic, Will give 1L fluid bolus and repeat H&H in 1 hour, continue to monitor vital signs closely  Uterus firm and normal lochia per RN    Clindamycin and gentamicin will be continued x 24 hours

## 2024-02-04 NOTE — PROGRESS NOTES
In to see patient and review plan of care    She is transitioning well  Lochia remains normal, uterus firm and normal position    Repeat Hbg 13.8 --> 11.5 --> 10.6    /62   Pulse (!) 139   Temp 98 °F (36.7 °C) (Oral)   Resp 16   Ht 1.575 m (5' 2\")   Wt 116.6 kg (257 lb)   SpO2 96%   Breastfeeding Unknown   BMI 47.01 kg/m²     Discussed would like to repeat hemoglobin once more tonight and if stable then plan on H&H at 6 am. Her tachycardia improved some to the 120's, but not persisting 130-140's. Will also give 200mg of venofer as transfusion would not be recommended at 10.6 hemoglobin  She has also received 1L fluid bolus    Patient agrees with plan

## 2024-02-05 VITALS
TEMPERATURE: 97.8 F | DIASTOLIC BLOOD PRESSURE: 73 MMHG | SYSTOLIC BLOOD PRESSURE: 129 MMHG | HEART RATE: 97 BPM | WEIGHT: 257 LBS | RESPIRATION RATE: 18 BRPM | HEIGHT: 62 IN | OXYGEN SATURATION: 99 % | BODY MASS INDEX: 47.29 KG/M2

## 2024-02-05 PROCEDURE — 6370000000 HC RX 637 (ALT 250 FOR IP): Performed by: OBSTETRICS & GYNECOLOGY

## 2024-02-05 RX ORDER — IBUPROFEN 800 MG/1
800 TABLET ORAL EVERY 8 HOURS SCHEDULED
Qty: 120 TABLET | Refills: 3 | Status: SHIPPED | OUTPATIENT
Start: 2024-02-05

## 2024-02-05 RX ORDER — ACETAMINOPHEN 500 MG
500 TABLET ORAL 4 TIMES DAILY PRN
Qty: 30 TABLET | Refills: 0 | Status: SHIPPED | OUTPATIENT
Start: 2024-02-05

## 2024-02-05 RX ORDER — DOCUSATE SODIUM 100 MG/1
100 CAPSULE, LIQUID FILLED ORAL DAILY PRN
Qty: 30 CAPSULE | Refills: 0 | Status: SHIPPED | OUTPATIENT
Start: 2024-02-05

## 2024-02-05 RX ADMIN — IBUPROFEN 800 MG: 400 TABLET, FILM COATED ORAL at 16:33

## 2024-02-05 RX ADMIN — ACETAMINOPHEN 1000 MG: 500 TABLET ORAL at 14:13

## 2024-02-05 RX ADMIN — ACETAMINOPHEN 1000 MG: 500 TABLET ORAL at 00:42

## 2024-02-05 RX ADMIN — IBUPROFEN 800 MG: 400 TABLET, FILM COATED ORAL at 00:42

## 2024-02-05 RX ADMIN — DOCUSATE SODIUM 100 MG: 100 CAPSULE, LIQUID FILLED ORAL at 08:10

## 2024-02-05 RX ADMIN — IBUPROFEN 800 MG: 400 TABLET, FILM COATED ORAL at 08:09

## 2024-02-05 ASSESSMENT — PAIN DESCRIPTION - DESCRIPTORS
DESCRIPTORS: SORE
DESCRIPTORS: CRAMPING;DISCOMFORT
DESCRIPTORS: ACHING;CRAMPING

## 2024-02-05 ASSESSMENT — PAIN DESCRIPTION - ORIENTATION
ORIENTATION: MID;LOWER
ORIENTATION: MID;LOWER
ORIENTATION: LOWER

## 2024-02-05 ASSESSMENT — PAIN SCALES - GENERAL
PAINLEVEL_OUTOF10: 1
PAINLEVEL_OUTOF10: 4
PAINLEVEL_OUTOF10: 1
PAINLEVEL_OUTOF10: 4

## 2024-02-05 ASSESSMENT — PAIN - FUNCTIONAL ASSESSMENT
PAIN_FUNCTIONAL_ASSESSMENT: ACTIVITIES ARE NOT PREVENTED

## 2024-02-05 ASSESSMENT — PAIN DESCRIPTION - LOCATION
LOCATION: ABDOMEN
LOCATION: ABDOMEN
LOCATION: VAGINA

## 2024-02-05 NOTE — DISCHARGE INSTRUCTIONS
hand you can control your baby's head to bring their mouth to your breast.  Try different positions with each feeding. If you are having problems, ask for help from your doctor or a lactation consultant.  To get your baby to latch on:  Support and narrow your breast with one hand using a \"U hold,\" with your thumb on the outer side of your breast and your fingers on the inner side. You can also use a \"C hold,\" with all your fingers below the nipple and your thumb above it. Try the different holds to get the deepest latch for whichever breastfeeding position you use. Your other arm is behind your baby's back, with your hand supporting the base of the baby's head. Position your fingers and thumb to point toward your baby's ears.  You can touch your baby's lower lip with your nipple to get your baby's mouth to open. Wait until your baby opens up really wide, like a big yawn. Then be sure to bring the baby quickly to your breast--not your breast to the baby. As you bring your baby toward your breast, use your other hand to support the breast and guide it into your baby's mouth.  Both the nipple and a large portion of the darker area around the nipple (areola) should be in the baby's mouth. The baby's lips should be flared outward, not folded in (inverted).  Listen for a regular sucking and swallowing pattern while the baby is feeding. If you can't see or hear a swallowing pattern, watch the baby's ears. They will wiggle slightly when the baby swallows. If the baby's nose appears to be blocked by your breast, bring your baby's body closer to you. This will help tilt the baby's head back slightly, so just the edge of one nostril is clear for breathing.  When your baby is latched, you can usually remove your hand from supporting your breast and use it to cradle under your baby. Now just relax and breastfeed your baby.  You will know that your baby is feeding well when:  Your baby's mouth covers a lot of the areola, and the

## 2024-02-05 NOTE — PROGRESS NOTES
Post-Partum Day Number 2 Progress Note    Tiki Martinez     Assessment: Doing well, post partum day 2 s/p VAVD, uterine inversion, retained products requiring banjo curette.  . Hgb 13.8--> 9.4 --> 9.3     Plan:  - Continue routine postpartum and perineal care as well as maternal education.  - Repeat CBC @ 12. Continue to follow. Bleeding appropriate.  - The risks and benefits of the circumcision  procedure and anesthesia including: bleeding, infection, variability of cosmetic results were discussed at length with the mother. She is aware that future repeat procedures may be necessary. She gives informed consent to proceed as noted and her questions are answered  - Plan discharge home today    Information for the patient's :  AVA Martinez [[id number]]   Vaginal, Vacuum (Extractor)  Patient doing well without significant complaint.  Voiding without difficulty, normal lochia.    Vitals:  /76   Pulse 100   Temp 97.9 °F (36.6 °C) (Oral)   Resp 17   Ht 1.575 m (5' 2\")   Wt 116.6 kg (257 lb)   SpO2 100%   Breastfeeding Unknown   BMI 47.01 kg/m²   Temp (24hrs), Av.5 °F (36.9 °C), Min:97.9 °F (36.6 °C), Max:99 °F (37.2 °C)        Exam:   Patient without distress.                  Fundus firm, nontender per nursing fundal checks.                Perineum with normal lochia noted per nursing assessment.                Lower extremities are negative for pathological edema.    Labs:     Lab Results   Component Value Date/Time    WBC 18.7 2024 12:01 PM    WBC 14.5 2024 09:15 AM    HGB 9.3 2024 12:01 PM    HGB 9.4 2024 06:14 AM    HGB 10.4 2024 10:02 PM    HGB 10.6 2024 08:33 PM    HGB 11.5 2024 06:52 PM    HGB 13.8 2024 09:15 AM    HCT 28.6 2024 12:01 PM    HCT 28.9 2024 06:14 AM    HCT 31.3 2024 10:02 PM    HCT 32.4 2024 08:33 PM    HCT 34.4 2024 06:52 PM    HCT 42.0 2024 09:15 AM     2024

## 2024-02-05 NOTE — PROGRESS NOTES
[place]  Depression Screening Prior to Discharge:      EPDS screening completed.     I have completed education and provided available resources for postpartum depression to the patient. Patient has verbalized understanding of signs and symptoms to report and all questions answered.     Based on EPDS score of   Postpartum Depression: High Risk (2024)    [place]  Depression Scale     Last EPDS Total Score: 9     Last EPDS Self Harm Result: Hardly ever    patient has a scheduled follow-up appointment in 1 week

## 2024-02-05 NOTE — DISCHARGE SUMMARY
Obstetrical Discharge Summary     Name: Tiki Martinez MRN: [id number]  SSN: [id number]    YOB: 1995  Age: 28 y.o.  Sex: female      Admit Date: 2/3/2024    Discharge Date: 2024     Admitting Physician: Patricia Mulligan MD     Attending Physician:  Iram Roy MD     Admission Diagnoses: PROM (premature rupture of membranes) [O42.90]    Discharge Diagnoses:   Information for the patient's :  AVA Martinez [[id number]]   @[id number]@      Additional Diagnoses:  No components found for: \"OBEXTABORH\", \"OBEXTABSCRN\", \"OBEXTRUBELLA\", \"OBEXTGRBS\"    Hospital Course: VAVD, uterine inversion, retained products requiring banjo curette.  . Hgb 13.8--> 9.4 --> 9.3     Patient Instructions:   Current Discharge Medication List        START taking these medications    Details   ibuprofen (ADVIL;MOTRIN) 800 MG tablet Take 1 tablet by mouth every 8 hours  Qty: 120 tablet, Refills: 3      acetaminophen (TYLENOL) 500 MG tablet Take 1 tablet by mouth 4 times daily as needed for Pain  Qty: 30 tablet, Refills: 0      docusate sodium (COLACE) 100 MG capsule Take 1 capsule by mouth daily as needed for Constipation  Qty: 30 capsule, Refills: 0           STOP taking these medications       Prenatal MV-Min-Fe Fum-FA-DHA (PRENATAL 1 PO) Comments:   Reason for Stopping:         ondansetron (ZOFRAN-ODT) 8 MG TBDP disintegrating tablet Comments:   Reason for Stopping:         Doxylamine Succinate, Sleep, (UNISOM PO) Comments:   Reason for Stopping:               Disposition at Discharge: Home or self care    Condition at Discharge: Stable    Reference my discharge instructions.    No follow-ups on file.     Signed By:  Andra Nath MD     2024

## 2024-02-05 NOTE — PROGRESS NOTES
24 1601   Visit Information   Lactation Consult Visit Type IP Initial Consult   Visit Length 30 minutes   Referral Received From Referred by Nurse   Reason for Visit Normal  Visit   Breast Feeding History/Assessment   Left Breast Soft   Left Nipple Protrude with stimulation   Right Nipple Protrude with stimulation   Right Breast Soft   Breastfeeding History No   Feeding Assessment: Maternal Factors   Position and Latch With assistance   Maternal Response Attentive   Feeding Assessment: Infant Factors   Infant Supplementation Expressed Breast Milk   Left Side Feeding   Infant Latch Observations Rooting;Wide open mouth;No latch was achieved   Right Side Feeding   Infant Latch Observations Rooting;Wide open mouth;No latch was achieved   Infant Position Other (Comment)  (recent circumcision)   LATCH Documentation   Latch 0   LATCH Score 0   Care Plan/Breast Care   Lactation Comment Fist time mother who paused from nursing infant in the first day and has minimal  colostrum. Has had minimal instruction for latching.  Formula used since birth.  Mother given instructions in amounts to feed infant.  Has Zimory pump at home with one size flange set up.  Pumping now with about 10 cc pumped.  Her mother breast fed 4.  Encouraged mother lots of skin to skin and to use mother and [employer] as a help.  Talked about correct amounts to give baby to match milk supply without decreasing supply.  Instructed to avoid pacifier.  Mother given syringes to collect milk and instructed how to express drops.     Instructed in hand expression technique.  Demonstrated expression of drops. Encouraged caregiver to help mother during periods of exhaustion.   Instructed to avoid pacifier use, especially during the first 2 weeks before milk supply is established.  Discussed the effect of pacifier use on the latching technique. Instructed to offer the breast rather than the pacifier.   Instructed to feed every 2-3 hours